# Patient Record
Sex: FEMALE | Race: WHITE | Employment: OTHER | URBAN - METROPOLITAN AREA
[De-identification: names, ages, dates, MRNs, and addresses within clinical notes are randomized per-mention and may not be internally consistent; named-entity substitution may affect disease eponyms.]

---

## 2017-01-01 ENCOUNTER — HOSPITAL ENCOUNTER (OUTPATIENT)
Dept: LAB | Age: 72
Discharge: HOME | End: 2017-05-03
Payer: MEDICARE

## 2017-01-01 ENCOUNTER — TELEPHONE (OUTPATIENT)
Dept: HEMATOLOGY | Age: 72
End: 2017-01-01

## 2017-01-01 ENCOUNTER — OFFICE VISIT (OUTPATIENT)
Dept: HEMATOLOGY | Age: 72
End: 2017-01-01

## 2017-01-01 ENCOUNTER — HOSPITAL ENCOUNTER (OUTPATIENT)
Dept: ULTRASOUND IMAGING | Age: 72
Discharge: HOME | End: 2017-02-24
Attending: INTERNAL MEDICINE
Payer: MEDICARE

## 2017-01-01 ENCOUNTER — HOSPITAL ENCOUNTER (OUTPATIENT)
Dept: LAB | Age: 72
Discharge: HOME | End: 2017-09-11
Payer: MEDICARE

## 2017-01-01 ENCOUNTER — DOCUMENTATION ONLY (OUTPATIENT)
Dept: HEMATOLOGY | Age: 72
End: 2017-01-01

## 2017-01-01 ENCOUNTER — NURSE NAVIGATOR (OUTPATIENT)
Dept: HEMATOLOGY | Age: 72
End: 2017-01-01

## 2017-01-01 VITALS
SYSTOLIC BLOOD PRESSURE: 177 MMHG | WEIGHT: 95.6 LBS | BODY MASS INDEX: 16.94 KG/M2 | HEART RATE: 55 BPM | DIASTOLIC BLOOD PRESSURE: 75 MMHG | TEMPERATURE: 97 F | OXYGEN SATURATION: 99 % | HEIGHT: 63 IN | RESPIRATION RATE: 14 BRPM

## 2017-01-01 VITALS
HEIGHT: 63 IN | WEIGHT: 107 LBS | OXYGEN SATURATION: 100 % | HEART RATE: 54 BPM | BODY MASS INDEX: 18.96 KG/M2 | TEMPERATURE: 97 F | RESPIRATION RATE: 18 BRPM | DIASTOLIC BLOOD PRESSURE: 62 MMHG | SYSTOLIC BLOOD PRESSURE: 172 MMHG

## 2017-01-01 VITALS
HEART RATE: 69 BPM | OXYGEN SATURATION: 98 % | BODY MASS INDEX: 18.25 KG/M2 | HEIGHT: 63 IN | SYSTOLIC BLOOD PRESSURE: 167 MMHG | WEIGHT: 103 LBS | TEMPERATURE: 97 F | DIASTOLIC BLOOD PRESSURE: 72 MMHG | RESPIRATION RATE: 16 BRPM

## 2017-01-01 DIAGNOSIS — Z94.4 S/P LIVER TRANSPLANT (HCC): ICD-10-CM

## 2017-01-01 DIAGNOSIS — R74.8 ELEVATED ALKALINE PHOSPHATASE LEVEL: Primary | ICD-10-CM

## 2017-01-01 DIAGNOSIS — R74.8 ELEVATED ALKALINE PHOSPHATASE LEVEL: ICD-10-CM

## 2017-01-01 DIAGNOSIS — B18.2 CHRONIC HEPATITIS C WITHOUT HEPATIC COMA (HCC): Primary | ICD-10-CM

## 2017-01-01 DIAGNOSIS — Z94.4 S/P LIVER TRANSPLANT (HCC): Primary | ICD-10-CM

## 2017-01-01 DIAGNOSIS — B18.2 CHRONIC HEPATITIS C WITHOUT HEPATIC COMA (HCC): ICD-10-CM

## 2017-01-01 LAB
ALBUMIN SERPL BCP-MCNC: 3.7 G/DL (ref 3.4–5)
ALBUMIN SERPL-MCNC: 3.7 G/DL (ref 3.4–5)
ALBUMIN SERPL-MCNC: 3.8 G/DL (ref 3.6–4.8)
ALBUMIN SERPL-MCNC: 3.9 G/DL (ref 3.6–4.8)
ALBUMIN SERPL-MCNC: 3.9 G/DL (ref 3.6–4.8)
ALBUMIN SERPL-MCNC: 4 G/DL (ref 3.6–4.8)
ALBUMIN SERPL-MCNC: 4 G/DL (ref 3.6–4.8)
ALBUMIN SERPL-MCNC: 4.2 G/DL (ref 3.6–4.8)
ALBUMIN SERPL-MCNC: 4.2 G/DL (ref 3.6–4.8)
ALBUMIN SERPL-MCNC: 4.4 G/DL (ref 3.5–4.8)
ALBUMIN/GLOB SERPL: 1.1 {RATIO} (ref 0.8–1.7)
ALBUMIN/GLOB SERPL: 1.1 {RATIO} (ref 0.8–1.7)
ALP BONE CFR SERPL: 85 % (ref 14–68)
ALP INTEST CFR SERPL: 0 % (ref 0–18)
ALP LIVER CFR SERPL: 15 % (ref 18–85)
ALP SERPL-CCNC: 126 U/L (ref 45–117)
ALP SERPL-CCNC: 134 IU/L (ref 39–117)
ALP SERPL-CCNC: 135 IU/L (ref 39–117)
ALP SERPL-CCNC: 148 U/L (ref 45–117)
ALP SERPL-CCNC: 157 IU/L (ref 39–117)
ALP SERPL-CCNC: 167 IU/L (ref 39–117)
ALP SERPL-CCNC: 189 IU/L (ref 39–117)
ALP SERPL-CCNC: 217 IU/L (ref 39–117)
ALP SERPL-CCNC: 229 IU/L (ref 39–117)
ALP SERPL-CCNC: 242 IU/L (ref 39–117)
ALP SERPL-CCNC: 247 IU/L (ref 39–117)
ALT SERPL-CCNC: 12 IU/L (ref 0–32)
ALT SERPL-CCNC: 13 IU/L (ref 0–32)
ALT SERPL-CCNC: 14 IU/L (ref 0–32)
ALT SERPL-CCNC: 17 IU/L (ref 0–32)
ALT SERPL-CCNC: 19 U/L (ref 13–56)
ALT SERPL-CCNC: 20 IU/L (ref 0–32)
ALT SERPL-CCNC: 23 IU/L (ref 0–32)
ALT SERPL-CCNC: 35 U/L (ref 13–56)
ALT SERPL-CCNC: 8 IU/L (ref 0–32)
ALT SERPL-CCNC: 9 IU/L (ref 0–32)
AMBIG ABBREV BMP8 DEFAULT, 977205: NORMAL
AMBIG ABBREV HFP7 DEFAULT, 977213: NORMAL
ANION GAP BLD CALC-SCNC: 13 MMOL/L (ref 3–18)
ANION GAP SERPL CALC-SCNC: 15 MMOL/L (ref 3–18)
AST SERPL W P-5'-P-CCNC: 16 U/L (ref 15–37)
AST SERPL-CCNC: 15 IU/L (ref 0–40)
AST SERPL-CCNC: 15 IU/L (ref 0–40)
AST SERPL-CCNC: 18 IU/L (ref 0–40)
AST SERPL-CCNC: 19 IU/L (ref 0–40)
AST SERPL-CCNC: 19 IU/L (ref 0–40)
AST SERPL-CCNC: 23 IU/L (ref 0–40)
AST SERPL-CCNC: 26 U/L (ref 15–37)
AST SERPL-CCNC: 31 IU/L (ref 0–40)
AST SERPL-CCNC: 32 IU/L (ref 0–40)
BASOPHILS # BLD AUTO: 0 K/UL (ref 0–0.06)
BASOPHILS # BLD AUTO: 0 X10E3/UL (ref 0–0.2)
BASOPHILS # BLD: 0 % (ref 0–2)
BASOPHILS # BLD: 0 K/UL (ref 0–0.06)
BASOPHILS NFR BLD AUTO: 0 %
BASOPHILS NFR BLD: 0 % (ref 0–2)
BILIRUB DIRECT SERPL-MCNC: 0.1 MG/DL (ref 0–0.2)
BILIRUB DIRECT SERPL-MCNC: 0.13 MG/DL (ref 0–0.4)
BILIRUB DIRECT SERPL-MCNC: 0.17 MG/DL (ref 0–0.4)
BILIRUB DIRECT SERPL-MCNC: 0.18 MG/DL (ref 0–0.4)
BILIRUB DIRECT SERPL-MCNC: 0.19 MG/DL (ref 0–0.4)
BILIRUB DIRECT SERPL-MCNC: 0.2 MG/DL (ref 0–0.2)
BILIRUB DIRECT SERPL-MCNC: 0.21 MG/DL (ref 0–0.4)
BILIRUB DIRECT SERPL-MCNC: 0.21 MG/DL (ref 0–0.4)
BILIRUB DIRECT SERPL-MCNC: 0.23 MG/DL (ref 0–0.4)
BILIRUB DIRECT SERPL-MCNC: 0.26 MG/DL (ref 0–0.4)
BILIRUB SERPL-MCNC: 0.4 MG/DL (ref 0–1.2)
BILIRUB SERPL-MCNC: 0.5 MG/DL (ref 0.2–1)
BILIRUB SERPL-MCNC: 0.5 MG/DL (ref 0–1.2)
BILIRUB SERPL-MCNC: 0.5 MG/DL (ref 0–1.2)
BILIRUB SERPL-MCNC: 0.6 MG/DL (ref 0–1.2)
BILIRUB SERPL-MCNC: 0.7 MG/DL (ref 0.2–1)
BILIRUB SERPL-MCNC: 0.7 MG/DL (ref 0–1.2)
BILIRUB SERPL-MCNC: 0.7 MG/DL (ref 0–1.2)
BUN SERPL-MCNC: 35 MG/DL (ref 8–27)
BUN SERPL-MCNC: 46 MG/DL (ref 8–27)
BUN SERPL-MCNC: 51 MG/DL (ref 8–27)
BUN SERPL-MCNC: 51 MG/DL (ref 8–27)
BUN SERPL-MCNC: 60 MG/DL (ref 8–27)
BUN SERPL-MCNC: 63 MG/DL (ref 8–27)
BUN SERPL-MCNC: 63 MG/DL (ref 8–27)
BUN SERPL-MCNC: 69 MG/DL (ref 8–27)
BUN SERPL-MCNC: 82 MG/DL (ref 7–18)
BUN SERPL-MCNC: 87 MG/DL (ref 7–18)
BUN/CREAT SERPL: 10 (ref 12–20)
BUN/CREAT SERPL: 10 (ref 12–20)
BUN/CREAT SERPL: 6 (ref 12–28)
BUN/CREAT SERPL: 7 (ref 11–26)
BUN/CREAT SERPL: 7 (ref 11–26)
BUN/CREAT SERPL: 8 (ref 11–26)
BUN/CREAT SERPL: 8 (ref 11–26)
BUN/CREAT SERPL: 9 (ref 11–26)
BUN/CREAT SERPL: 9 (ref 12–28)
BUN/CREAT SERPL: 9 (ref 12–28)
CALCIUM SERPL-MCNC: 8.5 MG/DL (ref 8.7–10.3)
CALCIUM SERPL-MCNC: 9 MG/DL (ref 8.7–10.3)
CALCIUM SERPL-MCNC: 9.2 MG/DL (ref 8.5–10.1)
CALCIUM SERPL-MCNC: 9.2 MG/DL (ref 8.7–10.3)
CALCIUM SERPL-MCNC: 9.4 MG/DL (ref 8.7–10.3)
CALCIUM SERPL-MCNC: 9.4 MG/DL (ref 8.7–10.3)
CALCIUM SERPL-MCNC: 9.5 MG/DL (ref 8.7–10.3)
CALCIUM SERPL-MCNC: 9.9 MG/DL (ref 8.5–10.1)
CHLORIDE SERPL-SCNC: 100 MMOL/L (ref 100–108)
CHLORIDE SERPL-SCNC: 90 MMOL/L (ref 96–106)
CHLORIDE SERPL-SCNC: 90 MMOL/L (ref 96–106)
CHLORIDE SERPL-SCNC: 92 MMOL/L (ref 96–106)
CHLORIDE SERPL-SCNC: 92 MMOL/L (ref 96–106)
CHLORIDE SERPL-SCNC: 93 MMOL/L (ref 96–106)
CHLORIDE SERPL-SCNC: 94 MMOL/L (ref 96–106)
CHLORIDE SERPL-SCNC: 95 MMOL/L (ref 96–106)
CHLORIDE SERPL-SCNC: 97 MMOL/L (ref 96–106)
CHLORIDE SERPL-SCNC: 99 MMOL/L (ref 100–108)
CO2 SERPL-SCNC: 18 MMOL/L (ref 18–29)
CO2 SERPL-SCNC: 20 MMOL/L (ref 18–29)
CO2 SERPL-SCNC: 21 MMOL/L (ref 18–29)
CO2 SERPL-SCNC: 21 MMOL/L (ref 21–32)
CO2 SERPL-SCNC: 22 MMOL/L (ref 18–29)
CO2 SERPL-SCNC: 23 MMOL/L (ref 18–29)
CO2 SERPL-SCNC: 26 MMOL/L (ref 18–29)
CO2 SERPL-SCNC: 27 MMOL/L (ref 21–32)
CREAT SERPL-MCNC: 5.69 MG/DL (ref 0.57–1)
CREAT SERPL-MCNC: 6.09 MG/DL (ref 0.57–1)
CREAT SERPL-MCNC: 6.74 MG/DL (ref 0.57–1)
CREAT SERPL-MCNC: 7.14 MG/DL (ref 0.57–1)
CREAT SERPL-MCNC: 7.26 MG/DL (ref 0.57–1)
CREAT SERPL-MCNC: 7.85 MG/DL (ref 0.57–1)
CREAT SERPL-MCNC: 7.86 MG/DL (ref 0.57–1)
CREAT SERPL-MCNC: 7.86 MG/DL (ref 0.6–1.3)
CREAT SERPL-MCNC: 8.38 MG/DL (ref 0.57–1)
CREAT SERPL-MCNC: 8.83 MG/DL (ref 0.6–1.3)
CYCLOSPORINE BLD IA-MCNC: 101 NG/ML (ref 100–400)
CYCLOSPORINE BLD IA-MCNC: 32 NG/ML (ref 100–400)
CYCLOSPORINE BLD IA-MCNC: 52 NG/ML (ref 100–400)
CYCLOSPORINE BLD LC/MS/MS-MCNC: 213 NG/ML (ref 100–400)
CYCLOSPORINE BLD LC/MS/MS-MCNC: 25 NG/ML (ref 100–400)
CYCLOSPORINE BLD LC/MS/MS-MCNC: 321 NG/ML (ref 100–400)
CYCLOSPORINE BLD LC/MS/MS-MCNC: 35 NG/ML (ref 100–400)
CYCLOSPORINE BLD LC/MS/MS-MCNC: 50 NG/ML (ref 100–400)
CYCLOSPORINE BLD LC/MS/MS-MCNC: 62 NG/ML (ref 100–400)
CYCLOSPORINE BLD LC/MS/MS-MCNC: NORMAL NG/ML (ref 100–400)
DIFFERENTIAL METHOD BLD: ABNORMAL
DIFFERENTIAL METHOD BLD: ABNORMAL
EOSINOPHIL # BLD AUTO: 0.2 X10E3/UL (ref 0–0.4)
EOSINOPHIL # BLD AUTO: 0.3 X10E3/UL (ref 0–0.4)
EOSINOPHIL # BLD AUTO: 0.3 X10E3/UL (ref 0–0.4)
EOSINOPHIL # BLD AUTO: 0.4 X10E3/UL (ref 0–0.4)
EOSINOPHIL # BLD AUTO: 0.4 X10E3/UL (ref 0–0.4)
EOSINOPHIL # BLD: 0.2 K/UL (ref 0–0.4)
EOSINOPHIL # BLD: 0.4 K/UL (ref 0–0.4)
EOSINOPHIL NFR BLD AUTO: 2 %
EOSINOPHIL NFR BLD AUTO: 2 %
EOSINOPHIL NFR BLD AUTO: 3 %
EOSINOPHIL NFR BLD AUTO: 3 %
EOSINOPHIL NFR BLD AUTO: 4 %
EOSINOPHIL NFR BLD AUTO: 4 %
EOSINOPHIL NFR BLD AUTO: 5 %
EOSINOPHIL NFR BLD AUTO: 5 %
EOSINOPHIL NFR BLD: 2 % (ref 0–5)
EOSINOPHIL NFR BLD: 4 % (ref 0–5)
ERYTHROCYTE [DISTWIDTH] IN BLOOD BY AUTOMATED COUNT: 16.7 % (ref 12.3–15.4)
ERYTHROCYTE [DISTWIDTH] IN BLOOD BY AUTOMATED COUNT: 16.9 % (ref 11.6–14.5)
ERYTHROCYTE [DISTWIDTH] IN BLOOD BY AUTOMATED COUNT: 17.5 % (ref 12.3–15.4)
ERYTHROCYTE [DISTWIDTH] IN BLOOD BY AUTOMATED COUNT: 17.9 % (ref 11.6–14.5)
ERYTHROCYTE [DISTWIDTH] IN BLOOD BY AUTOMATED COUNT: 18.1 % (ref 12.3–15.4)
ERYTHROCYTE [DISTWIDTH] IN BLOOD BY AUTOMATED COUNT: 18.3 % (ref 12.3–15.4)
ERYTHROCYTE [DISTWIDTH] IN BLOOD BY AUTOMATED COUNT: 18.3 % (ref 12.3–15.4)
ERYTHROCYTE [DISTWIDTH] IN BLOOD BY AUTOMATED COUNT: 18.4 % (ref 12.3–15.4)
ERYTHROCYTE [DISTWIDTH] IN BLOOD BY AUTOMATED COUNT: 19.2 % (ref 12.3–15.4)
ERYTHROCYTE [DISTWIDTH] IN BLOOD BY AUTOMATED COUNT: 20.5 % (ref 12.3–15.4)
GGT SERPL-CCNC: 21 IU/L (ref 0–60)
GLOBULIN SER CALC-MCNC: 3.4 G/DL (ref 2–4)
GLOBULIN SER CALC-MCNC: 3.5 G/DL (ref 2–4)
GLUCOSE SERPL-MCNC: 107 MG/DL (ref 65–99)
GLUCOSE SERPL-MCNC: 125 MG/DL (ref 65–99)
GLUCOSE SERPL-MCNC: 172 MG/DL (ref 74–99)
GLUCOSE SERPL-MCNC: 177 MG/DL (ref 65–99)
GLUCOSE SERPL-MCNC: 195 MG/DL (ref 65–99)
GLUCOSE SERPL-MCNC: 202 MG/DL (ref 65–99)
GLUCOSE SERPL-MCNC: 203 MG/DL (ref 74–99)
GLUCOSE SERPL-MCNC: 204 MG/DL (ref 65–99)
GLUCOSE SERPL-MCNC: 228 MG/DL (ref 65–99)
GLUCOSE SERPL-MCNC: 333 MG/DL (ref 65–99)
HCT VFR BLD AUTO: 32.9 % (ref 34–46.6)
HCT VFR BLD AUTO: 33.8 % (ref 34–46.6)
HCT VFR BLD AUTO: 33.9 % (ref 35–45)
HCT VFR BLD AUTO: 34.2 % (ref 34–46.6)
HCT VFR BLD AUTO: 35.9 % (ref 34–46.6)
HCT VFR BLD AUTO: 37.1 % (ref 34–46.6)
HCT VFR BLD AUTO: 37.8 % (ref 34–46.6)
HCT VFR BLD AUTO: 39.1 % (ref 35–45)
HCT VFR BLD AUTO: 39.4 % (ref 34–46.6)
HCT VFR BLD AUTO: 40.5 % (ref 34–46.6)
HCV GRAPH, HCVGR: NORMAL
HCV RNA SERPL NAA+PROBE-ACNC: NORMAL IU/ML
HGB BLD-MCNC: 11.1 G/DL (ref 11.1–15.9)
HGB BLD-MCNC: 11.1 G/DL (ref 12–16)
HGB BLD-MCNC: 11.2 G/DL (ref 11.1–15.9)
HGB BLD-MCNC: 11.6 G/DL (ref 11.1–15.9)
HGB BLD-MCNC: 11.9 G/DL (ref 11.1–15.9)
HGB BLD-MCNC: 12 G/DL (ref 11.1–15.9)
HGB BLD-MCNC: 12.3 G/DL (ref 11.1–15.9)
HGB BLD-MCNC: 12.8 G/DL (ref 12–16)
HGB BLD-MCNC: 12.9 G/DL (ref 11.1–15.9)
HGB BLD-MCNC: 13 G/DL (ref 11.1–15.9)
IMM GRANULOCYTES # BLD: 0 X10E3/UL (ref 0–0.1)
IMM GRANULOCYTES NFR BLD: 0 %
LYMPHOCYTES # BLD AUTO: 1.5 X10E3/UL (ref 0.7–3.1)
LYMPHOCYTES # BLD AUTO: 1.7 X10E3/UL (ref 0.7–3.1)
LYMPHOCYTES # BLD AUTO: 18 % (ref 21–52)
LYMPHOCYTES # BLD AUTO: 2.7 X10E3/UL (ref 0.7–3.1)
LYMPHOCYTES # BLD AUTO: 2.7 X10E3/UL (ref 0.7–3.1)
LYMPHOCYTES # BLD AUTO: 2.9 X10E3/UL (ref 0.7–3.1)
LYMPHOCYTES # BLD AUTO: 3 X10E3/UL (ref 0.7–3.1)
LYMPHOCYTES # BLD AUTO: 3.2 X10E3/UL (ref 0.7–3.1)
LYMPHOCYTES # BLD AUTO: 3.6 X10E3/UL (ref 0.7–3.1)
LYMPHOCYTES # BLD: 1.6 K/UL (ref 0.9–3.6)
LYMPHOCYTES # BLD: 3.6 K/UL (ref 0.9–3.6)
LYMPHOCYTES NFR BLD AUTO: 21 %
LYMPHOCYTES NFR BLD AUTO: 25 %
LYMPHOCYTES NFR BLD AUTO: 35 %
LYMPHOCYTES NFR BLD AUTO: 36 %
LYMPHOCYTES NFR BLD AUTO: 39 %
LYMPHOCYTES NFR BLD AUTO: 40 %
LYMPHOCYTES NFR BLD AUTO: 41 %
LYMPHOCYTES NFR BLD AUTO: 47 %
LYMPHOCYTES NFR BLD: 42 % (ref 21–52)
Lab: NORMAL
MAGNESIUM SERPL-MCNC: 2.2 MG/DL (ref 1.6–2.3)
MAGNESIUM SERPL-MCNC: 2.3 MG/DL (ref 1.6–2.3)
MAGNESIUM SERPL-MCNC: 2.3 MG/DL (ref 1.6–2.6)
MAGNESIUM SERPL-MCNC: 2.4 MG/DL (ref 1.6–2.3)
MAGNESIUM SERPL-MCNC: 2.4 MG/DL (ref 1.6–2.3)
MAGNESIUM SERPL-MCNC: 2.5 MG/DL (ref 1.6–2.3)
MAGNESIUM SERPL-MCNC: 2.6 MG/DL (ref 1.6–2.3)
MAGNESIUM SERPL-MCNC: 2.7 MG/DL (ref 1.6–2.3)
MAGNESIUM SERPL-MCNC: 2.8 MG/DL (ref 1.6–2.3)
MAGNESIUM SERPL-MCNC: 2.9 MG/DL (ref 1.6–2.6)
MCH RBC QN AUTO: 32.6 PG (ref 26.6–33)
MCH RBC QN AUTO: 33.1 PG (ref 26.6–33)
MCH RBC QN AUTO: 33.2 PG (ref 26.6–33)
MCH RBC QN AUTO: 33.5 PG (ref 24–34)
MCH RBC QN AUTO: 33.5 PG (ref 26.6–33)
MCH RBC QN AUTO: 33.9 PG (ref 26.6–33)
MCH RBC QN AUTO: 34.7 PG (ref 24–34)
MCH RBC QN AUTO: 34.7 PG (ref 26.6–33)
MCH RBC QN AUTO: 34.7 PG (ref 26.6–33)
MCH RBC QN AUTO: 35.1 PG (ref 26.6–33)
MCHC RBC AUTO-ENTMCNC: 32.1 G/DL (ref 31.5–35.7)
MCHC RBC AUTO-ENTMCNC: 32.3 G/DL (ref 31.5–35.7)
MCHC RBC AUTO-ENTMCNC: 32.5 G/DL (ref 31.5–35.7)
MCHC RBC AUTO-ENTMCNC: 32.7 G/DL (ref 31.5–35.7)
MCHC RBC AUTO-ENTMCNC: 32.7 G/DL (ref 31–37)
MCHC RBC AUTO-ENTMCNC: 32.7 G/DL (ref 31–37)
MCHC RBC AUTO-ENTMCNC: 32.8 G/DL (ref 31.5–35.7)
MCHC RBC AUTO-ENTMCNC: 33.1 G/DL (ref 31.5–35.7)
MCHC RBC AUTO-ENTMCNC: 33.9 G/DL (ref 31.5–35.7)
MCHC RBC AUTO-ENTMCNC: 34 G/DL (ref 31.5–35.7)
MCV RBC AUTO: 101 FL (ref 79–97)
MCV RBC AUTO: 102 FL (ref 79–97)
MCV RBC AUTO: 102.4 FL (ref 74–97)
MCV RBC AUTO: 103 FL (ref 79–97)
MCV RBC AUTO: 103 FL (ref 79–97)
MCV RBC AUTO: 105 FL (ref 79–97)
MCV RBC AUTO: 105 FL (ref 79–97)
MCV RBC AUTO: 106 FL (ref 74–97)
MCV RBC AUTO: 106 FL (ref 79–97)
MCV RBC AUTO: 98 FL (ref 79–97)
MONOCYTES # BLD AUTO: 0.3 X10E3/UL (ref 0.1–0.9)
MONOCYTES # BLD AUTO: 0.4 X10E3/UL (ref 0.1–0.9)
MONOCYTES # BLD AUTO: 0.4 X10E3/UL (ref 0.1–0.9)
MONOCYTES # BLD AUTO: 0.5 X10E3/UL (ref 0.1–0.9)
MONOCYTES # BLD AUTO: 0.5 X10E3/UL (ref 0.1–0.9)
MONOCYTES # BLD AUTO: 0.6 X10E3/UL (ref 0.1–0.9)
MONOCYTES # BLD: 0.5 K/UL (ref 0.05–1.2)
MONOCYTES # BLD: 0.6 K/UL (ref 0.05–1.2)
MONOCYTES NFR BLD AUTO: 5 %
MONOCYTES NFR BLD AUTO: 6 %
MONOCYTES NFR BLD AUTO: 7 %
MONOCYTES NFR BLD AUTO: 7 %
MONOCYTES NFR BLD AUTO: 7 % (ref 3–10)
MONOCYTES NFR BLD AUTO: 8 %
MONOCYTES NFR BLD AUTO: 9 %
MONOCYTES NFR BLD: 5 % (ref 3–10)
NEUTROPHILS # BLD AUTO: 2.8 X10E3/UL (ref 1.4–7)
NEUTROPHILS # BLD AUTO: 3.4 X10E3/UL (ref 1.4–7)
NEUTROPHILS # BLD AUTO: 3.8 X10E3/UL (ref 1.4–7)
NEUTROPHILS # BLD AUTO: 4 X10E3/UL (ref 1.4–7)
NEUTROPHILS # BLD AUTO: 4.3 X10E3/UL (ref 1.4–7)
NEUTROPHILS # BLD AUTO: 4.3 X10E3/UL (ref 1.4–7)
NEUTROPHILS # BLD AUTO: 4.5 X10E3/UL (ref 1.4–7)
NEUTROPHILS # BLD AUTO: 4.9 X10E3/UL (ref 1.4–7)
NEUTROPHILS NFR BLD AUTO: 40 %
NEUTROPHILS NFR BLD AUTO: 50 %
NEUTROPHILS NFR BLD AUTO: 51 %
NEUTROPHILS NFR BLD AUTO: 51 %
NEUTROPHILS NFR BLD AUTO: 53 %
NEUTROPHILS NFR BLD AUTO: 54 %
NEUTROPHILS NFR BLD AUTO: 64 %
NEUTROPHILS NFR BLD AUTO: 71 %
NEUTS SEG # BLD: 4.2 K/UL (ref 1.8–8)
NEUTS SEG # BLD: 6.5 K/UL (ref 1.8–8)
NEUTS SEG NFR BLD AUTO: 73 % (ref 40–73)
NEUTS SEG NFR BLD: 49 % (ref 40–73)
PLATELET # BLD AUTO: 208 X10E3/UL (ref 150–379)
PLATELET # BLD AUTO: 216 X10E3/UL (ref 150–379)
PLATELET # BLD AUTO: 220 X10E3/UL (ref 150–379)
PLATELET # BLD AUTO: 235 X10E3/UL (ref 150–379)
PLATELET # BLD AUTO: 237 K/UL (ref 135–420)
PLATELET # BLD AUTO: 247 X10E3/UL (ref 150–379)
PLATELET # BLD AUTO: 260 X10E3/UL (ref 150–379)
PLATELET # BLD AUTO: 270 X10E3/UL (ref 150–379)
PLATELET # BLD AUTO: 276 X10E3/UL (ref 150–379)
PLATELET # BLD AUTO: 277 K/UL (ref 135–420)
PMV BLD AUTO: 10.5 FL (ref 9.2–11.8)
PMV BLD AUTO: 11 FL (ref 9.2–11.8)
POTASSIUM SERPL-SCNC: 4.6 MMOL/L (ref 3.5–5.2)
POTASSIUM SERPL-SCNC: 4.6 MMOL/L (ref 3.5–5.2)
POTASSIUM SERPL-SCNC: 4.9 MMOL/L (ref 3.5–5.2)
POTASSIUM SERPL-SCNC: 5 MMOL/L (ref 3.5–5.2)
POTASSIUM SERPL-SCNC: 5.2 MMOL/L (ref 3.5–5.2)
POTASSIUM SERPL-SCNC: 5.3 MMOL/L (ref 3.5–5.2)
POTASSIUM SERPL-SCNC: 5.3 MMOL/L (ref 3.5–5.5)
POTASSIUM SERPL-SCNC: 5.4 MMOL/L (ref 3.5–5.5)
POTASSIUM SERPL-SCNC: 5.5 MMOL/L (ref 3.5–5.2)
POTASSIUM SERPL-SCNC: 5.9 MMOL/L (ref 3.5–5.2)
PROT SERPL-MCNC: 6.3 G/DL (ref 6–8.5)
PROT SERPL-MCNC: 6.4 G/DL (ref 6–8.5)
PROT SERPL-MCNC: 6.5 G/DL (ref 6–8.5)
PROT SERPL-MCNC: 6.5 G/DL (ref 6–8.5)
PROT SERPL-MCNC: 6.8 G/DL (ref 6–8.5)
PROT SERPL-MCNC: 6.9 G/DL (ref 6–8.5)
PROT SERPL-MCNC: 7.1 G/DL (ref 6.4–8.2)
PROT SERPL-MCNC: 7.1 G/DL (ref 6–8.5)
PROT SERPL-MCNC: 7.1 G/DL (ref 6–8.5)
PROT SERPL-MCNC: 7.2 G/DL (ref 6.4–8.2)
RBC # BLD AUTO: 3.19 X10E6/UL (ref 3.77–5.28)
RBC # BLD AUTO: 3.2 X10E6/UL (ref 3.77–5.28)
RBC # BLD AUTO: 3.31 M/UL (ref 4.2–5.3)
RBC # BLD AUTO: 3.43 X10E6/UL (ref 3.77–5.28)
RBC # BLD AUTO: 3.5 X10E6/UL (ref 3.77–5.28)
RBC # BLD AUTO: 3.54 X10E6/UL (ref 3.77–5.28)
RBC # BLD AUTO: 3.67 X10E6/UL (ref 3.77–5.28)
RBC # BLD AUTO: 3.69 M/UL (ref 4.2–5.3)
RBC # BLD AUTO: 3.89 X10E6/UL (ref 3.77–5.28)
RBC # BLD AUTO: 3.99 X10E6/UL (ref 3.77–5.28)
SERIAL MONITORING: NORMAL
SODIUM SERPL-SCNC: 132 MMOL/L (ref 134–144)
SODIUM SERPL-SCNC: 133 MMOL/L (ref 134–144)
SODIUM SERPL-SCNC: 135 MMOL/L (ref 134–144)
SODIUM SERPL-SCNC: 135 MMOL/L (ref 136–145)
SODIUM SERPL-SCNC: 136 MMOL/L (ref 134–144)
SODIUM SERPL-SCNC: 137 MMOL/L (ref 134–144)
SODIUM SERPL-SCNC: 137 MMOL/L (ref 134–144)
SODIUM SERPL-SCNC: 140 MMOL/L (ref 136–145)
WBC # BLD AUTO: 6.8 X10E3/UL (ref 3.4–10.8)
WBC # BLD AUTO: 6.8 X10E3/UL (ref 3.4–10.8)
WBC # BLD AUTO: 6.9 X10E3/UL (ref 3.4–10.8)
WBC # BLD AUTO: 7 X10E3/UL (ref 3.4–10.8)
WBC # BLD AUTO: 7.4 X10E3/UL (ref 3.4–10.8)
WBC # BLD AUTO: 7.5 X10E3/UL (ref 3.4–10.8)
WBC # BLD AUTO: 8 X10E3/UL (ref 3.4–10.8)
WBC # BLD AUTO: 8.6 K/UL (ref 4.6–13.2)
WBC # BLD AUTO: 8.9 K/UL (ref 4.6–13.2)
WBC # BLD AUTO: 8.9 X10E3/UL (ref 3.4–10.8)

## 2017-01-01 PROCEDURE — 83735 ASSAY OF MAGNESIUM: CPT | Performed by: NURSE PRACTITIONER

## 2017-01-01 PROCEDURE — 0346T US ABD COMP W ELASTOGRAPHY: CPT

## 2017-01-01 PROCEDURE — 87522 HEPATITIS C REVRS TRNSCRPJ: CPT | Performed by: NURSE PRACTITIONER

## 2017-01-01 PROCEDURE — 36415 COLL VENOUS BLD VENIPUNCTURE: CPT | Performed by: NURSE PRACTITIONER

## 2017-01-01 PROCEDURE — 80048 BASIC METABOLIC PNL TOTAL CA: CPT | Performed by: NURSE PRACTITIONER

## 2017-01-01 PROCEDURE — 85025 COMPLETE CBC W/AUTO DIFF WBC: CPT | Performed by: NURSE PRACTITIONER

## 2017-01-01 PROCEDURE — 80158 DRUG ASSAY CYCLOSPORINE: CPT | Performed by: NURSE PRACTITIONER

## 2017-01-01 PROCEDURE — 80076 HEPATIC FUNCTION PANEL: CPT | Performed by: NURSE PRACTITIONER

## 2017-01-01 RX ORDER — CYCLOSPORINE 25 MG/1
CAPSULE, GELATIN COATED ORAL
Qty: 120 CAP | Refills: 11 | Status: SHIPPED | OUTPATIENT
Start: 2017-01-01 | End: 2017-01-01 | Stop reason: SDUPTHER

## 2017-01-01 RX ORDER — CYCLOSPORINE 25 MG/1
CAPSULE, GELATIN COATED ORAL
Qty: 120 CAP | Refills: 11 | Status: SHIPPED | OUTPATIENT
Start: 2017-01-01

## 2017-01-01 RX ORDER — VENLAFAXINE HYDROCHLORIDE 37.5 MG/1
CAPSULE, EXTENDED RELEASE ORAL DAILY
COMMUNITY

## 2017-01-01 RX ORDER — CYCLOSPORINE 25 MG/1
CAPSULE, GELATIN COATED ORAL
Qty: 120 CAP | Refills: 11 | Status: CANCELLED | OUTPATIENT
Start: 2017-01-01

## 2017-01-01 RX ORDER — CYCLOSPORINE 25 MG/1
CAPSULE, GELATIN COATED ORAL
Qty: 120 CAP | Refills: 11
Start: 2017-01-01 | End: 2017-01-01 | Stop reason: SDUPTHER

## 2017-01-25 NOTE — PROGRESS NOTES
NN spoke with patient regarding increasing Cyclosporine dose. Per Dr. Lara Lopez pt advised to increase Cyclosporine 50 mg BID, repeat labs in 1 week. Pt verbalized clear understanding.

## 2017-02-02 NOTE — TELEPHONE ENCOUNTER
Spoke with patient regarding recent lab results 02/1/2017.   Pt Confirmed follow-up appointment with Dr. Pauly Hernández 02/08/17 at 10:00 am.

## 2017-02-08 NOTE — PROGRESS NOTES
[address], MD, FACP, [facility]     April Denita Santos, EFREM Corcoran MD, Helena Costa MD     [address], NP     Amira Arteaga, SKYLAR        [address], [address] [facility], [address]     [phone]     FAX: [address] [place] [address], [address] [place] [address], [address]     [phone]     FAX: [phone]       Patient Care Team:  Natalee Skelton MD as PCP - General (Internal Medicine)  Mei Horner RN as Nurse Navigator (Hepatology)  Amira Arteaga NP as Nurse Practitioner (Nurse Practitioner)  LT: Nasrin Garcia, [place], [place]      Problem List  Date Reviewed: 9/26/2016          Codes Class Noted    S/P liver transplant [facility]) ICD-10-CM: Z94.4  ICD-9-CM: V42.7  4/24/2016    Overview Signed 4/24/2016  7:32 AM by Martine Vizcaino MD     3/2001             Chronic hepatitis C ([facility] [address] ICD-10-CM: F72.6  ICD-9-CM: 070.54  4/24/2016        ESRD (end stage renal disease) on dialysis [facility]) ICD-10-CM: N18.6, Z99.2  ICD-9-CM: 585.6, V45.11  4/24/2016        Type II diabetes mellitus ([facility] [address] ICD-10-CM: E11.9  ICD-9-CM: 250.00  4/24/2016        S/P kidney transplant ICD-10-CM: Z94.0  ICD-9-CM: V42.0  4/24/2016    Overview Signed 4/24/2016  7:33 AM by Martine Vizcaino MD     3/2001.   At same time at LT             Osteoporosis ICD-10-CM: M81.0  ICD-9-CM: 733.00  4/24/2016        Chronic pancreatitis ([facility] [address] ICD-10-CM: K86.1  ICD-9-CM: 295.6  4/24/2016        Celiac disease ICD-10-CM: K90.0  ICD-9-CM: 579.0  4/24/2016        Retinopathy due to secondary diabetes ([facility] ICD-10-CM: E13.319  ICD-9-CM: 249.50, 362.01  4/24/2016        Anemia ICD-10-CM: D64.9  ICD-9-CM: 285.9  4/24/2016        CVA (cerebral vascular accident) [facility]) ICD-10-CM: I63.9  ICD-9-CM: 434.91  4/24/2016    Overview Signed 4/24/2016  7:35 AM by Amy Kohli Natalie Pierce MD     6/2011. Residual right sided weakness             Blind left eye ICD-10-CM: H54.42  ICD-9-CM: 369.60  4/24/2016        Ulcerative colitis ([facility] [address] ICD-10-CM: K51.90  ICD-9-CM: 556.9  4/24/2016        S/P total colectomy ICD-10-CM: Z90.49  ICD-9-CM: V45.72  4/24/2016    Overview Signed 4/24/2016  7:37 AM by Carmela Avila MD     For treatment of refractory UC                   Daniele Sinclair returns to the [address] for management of liver allograft function, to adjust and monitor immune suppression and management of recurrent chronic HCV in the liver graft. The active problem list, all pertinent past medical history, medications, liver histology, endoscopic studies, radiologic findings and laboratory findings related to the liver disorder were reviewed with the patient. The patient is a 73 y.o.  female who underwent a liver transplant in 3/2001 at [address] for cirrhosis secondary to chronic HCV. She began HCV on 08/30/2016 with [facility] [place] without ribavirin. She was treated for 12 weeks total.     She was last seen in 9/2016. After that office appointment and during [facility] treatment she fell and fractured her hip. She underwent hip replacement. She was then in rehab and is now finally home. During this time she did not miss any of the [facility] treatment. The most recent HCV RNA test was in 12/2016. That should have been 1 month after stopping HCV Treatment. She was HCV RNA undetectable. The patient is currently receiving cyclosporine and cellcept for immune suppression. The cyclosporin was adjusted while she was being treated for HCV. After HCV treatment stopped her cyclosporin level bottomed out to undetectable and the dose has since been adjusted. There has been no episodes of acute rejection.   Even when she had undetectable cyclosporin level the liver enzymes were still normal.      The patient underwent a simultaneous renal transplant at the time of the LT. The renal graft failed and has been back on dialysis since 9/2015. She apparently had a seizure on dialysis. This was evaluated by neurology and thought to be secondary to electrolyte shifts and/or hypotension. She is not on anti-seizure medications. The patient notes fatigue especially on dialysis days. She gets around very well with a walker. She lives with her son. The patient has limitations in functional activities secondary to these symptoms. The patient has not experienced fevers, chills, problems concentrating, swelling of the abdomen, swelling of the lower extremities, hematemesis, hematochezia. Allergies   Allergen Reactions    Codeine Other (comments)    Morphine Other (comments)    Naproxen Other (comments)       Current Outpatient Prescriptions   Medication Sig    cycloSPORINE (SANDIMMUNE) 25 mg capsule Take 2 tabs by mouth in the Morning. Take 2 tabs by mouth in the Evening. Indications: PREVENTION OF LIVER TRANSPLANT REJECTION    Dexlansoprazole (DEXILANT) 60 mg CpDB Take 1 Cap by mouth daily.  insulin glargine (LANTUS) 100 unit/mL injection 2 Units by SubCUTAneous route daily.  sodium bicarbonate 325 mg tablet Take 325 mg by mouth four (4) times daily.  predniSONE (DELTASONE) 5 mg tablet Take  by mouth.  b complex vitamins (B COMPLEX 1) tablet Take 1 Tab by mouth daily.  aspirin 81 mg chewable tablet Take 81 mg by mouth daily.  rosuvastatin (CRESTOR) 10 mg tablet Take 10 mg by mouth nightly.  ondansetron (ZOFRAN ODT) 4 mg disintegrating tablet Take 1 Tab by mouth every eight (8) hours as needed for Nausea.  CALCIUM CARBONATE (OS-CHARITY PO) Take 600 mg by mouth.  cholecalciferol, vitamin D3, 2,000 unit tab Take  by mouth.  insulin lispro (HUMALOG) 100 unit/mL injection by SubCUTAneous route.  DARBEPOETIN SHANTI IN POLYSORBAT IJ by Injection route.     amylase-lipase-protease (CREON 18793) 24,000-76,000 -120,000 unit capsule Take  by mouth three (3) times daily (with meals).  mycophenolate (CELLCEPT) 250 mg capsule Take 250 mg by mouth two (2) times a day.  magnesium oxide (MAG-OX) 400 mg tablet Take 400 mg by mouth daily.  nebivolol (BYSTOLIC) 5 mg tablet Take  by mouth daily.  pantoprazole (PROTONIX) 40 mg tablet Take 40 mg by mouth daily.  sodium polystyrene (KAYEXALATE) powder Take 15 g by mouth now. No current facility-administered medications for this visit. SYSTEM REVIEW NOT RELATED TO LIVER DISEASE OR REVIEWED ABOVE:  Constitution systems: Negative for fever, chills, weight gain, weight loss. Eyes: Negative for visual changes. ENT: Negative for sore throat, painful swallowing. Respiratory: Negative for cough, hemoptysis, SOB. Cardiology: Negative for chest pain, palpitations. GI:  Negative for constipation or diarrhea. : Negative for urinary frequency, dysuria, hematuria, nocturia. Skin: Negative for rash. Hematology: Negative for easy bruising, blood clots. Musculo-skelatal: Negative for back pain, muscle pain, weakness. Neurologic: Negative for headaches, dizziness, vertigo, memory problems not related to HE. Psychology: Negative for anxiety, depression. FAMILY HISTORY:  The father  of cancer. The mother  of cancer. There is no family history of liver disease. SOCIAL HISTORY:  The patient is . The patient has 3 children, and 8 grandchildren. The patient has never used tobacco products. The patient has never consumed significant amounts of alcohol. The patient used to work as as a . The patient has not worked since [address] [place] [address].         PHYSICAL EXAMINATION:  Visit Vitals    /75 (BP 1 Location: Left arm, BP Patient Position: Sitting)    Pulse (!) 55    Temp 97 °F (36.1 °C) (Tympanic)    Resp 14    Ht 5' 3\" (1.6 m)    Wt 95 lb 9.6 oz (43.4 kg)    SpO2 99%    BMI 16.93 kg/m2     General:  Uses a walker. Eyes: Sclera anicteric. ENT: No oral lesions. Thyroid normal.  Nodes: No adenopathy. Skin: No spider angiomata. No jaundice. No palmar erythema. Respiratory: Lungs clear to auscultation. Cardiovascular: Regular heart rate. No murmurs. No JVD. Abdomen: Transplant surgery scar. Soft non-tender. Liver size normal to percussion/palpation. Spleen not palpable. No obvious ascites. Extremities: Diaysis shunt. No edema. No muscle wasting. No gross arthritic changes. Neurologic: Alert and oriented. Cranial nerves grossly intact. No asterixis. LABORATORY STUDIES:  [facility] [place] of [address] [place] [address] & Units 1/30/2017 1/23/2017   WBC 3.4 - 10.8 x10E3/uL 6.9 7.0   ANC 1.4 - 7.0 x10E3/uL 4.9 3.4   HGB 11.1 - 15.9 g/dL 12.9 13.0    - 379 x10E3/uL 208 276   AST 0 - 40 IU/L 31 23   ALT 0 - 32 IU/L 13 17   Alk Phos 39 - 117 IU/L 242 (H) 247 (H)   Bili, Total 0.0 - 1.2 mg/dL 0.6 0.6   Bili, Direct 0.00 - 0.40 mg/dL 0.21 0.21   Albumin 3.6 - 4.8 g/dL 3.8 4.2   BUN 8 - 27 mg/dL 60 (H) 51 (H)   Creat 0.57 - 1.00 mg/dL 7.85 (H) 7.26 (H)   Na 134 - 144 mmol/L 132 (L) 136   K 3.5 - 5.2 mmol/L 5.9 (H) 5.5 (H)   Cl 96 - 106 mmol/L 92 (L) 94 (L)   CO2 18 - 29 mmol/L 20 20   Glucose 65 - 99 mg/dL 333 (H) 204 (H)   Magnesium 1.6 - 2.3 mg/dL 2.4 (H) 2.6 (H)     Liver Virology and Transplant Immune Suppression Cyclosporine Level   Latest Ref Rng & Units 100 - 400 ng/mL   1/30/2017 213   1/23/2017 None Detected   10/12/2016 245   9/28/2016 171       SEROLOGIES:  Serologies Latest Ref Rng 3/28/2016   Hep A Ab, Total NEGATIVE   Positive (A)   Hep B Surface Ag <1.00 Index <0.10   Hep B Surface Ag Interp NEG   NEGATIVE   Hep B Core Ab, Total NEGATIVE   NEGATIVE   Hep B Surface Ab >10.0 mIU/mL 45.28   Hep B Surface Ab Interp POS   POSITIVE   Hep C Genotype  1b   HCV RT-PCR, Quant  [id number]     LIVER HISTOLOGY:  7/2006. From [address]. No acute rejection. Recurrent HCV. Mild sinusoidal fibrosis.   5% steatosis. ENDOSCOPIC PROCEDURES:  Not available or performed    RADIOLOGY:  Not available or performed    OTHER TESTIN2015. ECHO heart. Normal LVEF 60%. Mild concentric LVH. Mild dilation LA. Mild tricuspid regurgitation. Mild pulmonary HTN 45 mmHg. Small pericardial effusion. ASSESSMENT AND PLAN:  Liver transplant with normal graft function. Recurrent HCV has been treated with Teofilo Judi. Will repet HCV RNA today. If this is undetectable she has achieved SVR/cure. The most recent laboratory studies indicate that the liver transaminases are normal, alkaline phosphatase is elevated, tests of hepatic synthetic and metabolic function are normal, and the platelet count is normal.     Will perform laboratory testing to monitor liver function and degree of liver injury. This will include hepatic panel, a CBC w/ diff, a BMP. Will measure cyclosporin level. The patient is receiving immune suppression with cyclosporine which is being well tolerated with only mild side effects. The immune suppression blood level is now in the normal range. Will continue the current dose. Persistent elevation in ALP since she fractured her hip. This may be related to bone. Will get GGT and fractionate the ALP. Cellcept is being tolerated well. Will continue at current dose. ESRD will be managed by her nephrologists. She will stay on dialysis. Hypercholesterolemia can be caused by immune suppression. Serum cholesterol is normal and does not need to be treated at this time. Hypertension can be caused by immune suppression and ESRD. Blood pressure is well controlled on current treatment. The patient was counseled regarding alcohol use. The patient was counseled regarding increased risk of skin cancer in transplant recipients and need to have any new skin lesions evaluated by dermatology and removed if suspicious. Osteoporosis is being treated with Vitamin D.   Will need bone density and additional treatment with a diphosphonate if bone density is very low. Patient should receive annual flu-vaccine from their primary care provider. Live vaccines should not be administered. All of the above issues were discussed with the patient. All questions were answered. The patient expressed a clear understanding of the above. [address] [address] in 2 months.       Carin Calderon MD  Liver [address] [place], [place] [address].  [phone]

## 2017-02-08 NOTE — PROGRESS NOTES
Reduction in Cyclosporine 25 mg, 2 tablets in the morning and 1 tablet in the evening (total 75 mg daily) per Dr. Linda Angel. Repeat labs the first of every month. Keep Cycloporine levels between 100-150.   F/u with Brandon Diaz NP.

## 2017-02-08 NOTE — MR AVS SNAPSHOT
Visit Information Date & Time Provider Department Dept. Phone Encounter #  
 2/8/2017 10:00 AM Porsha Kennedy MD [facility] [place] [phone] [id number] Upcoming Health Maintenance Date Due  
 LIPID PANEL Q1 1945 FOOT EXAM Q1 7/26/1957 MICROALBUMIN Q1 7/26/1957 EYE EXAM RETINAL OR DILATED Q1 7/26/1957 DTaP/Tdap/Td series (1 - Tdap) 7/26/1968 BREAST CANCER SCRN MAMMOGRAM 7/26/1997 FOBT Q 1 YEAR AGE 50-75 7/26/1997 ZOSTER VACCINE AGE 60> 7/26/2007 GLAUCOMA SCREENING Q2Y 7/26/2012 Pneumococcal 65+ High/Highest Risk (1 of 2 - PCV13) 7/26/2012 MEDICARE YEARLY EXAM 7/26/2012 INFLUENZA AGE 9 TO ADULT 8/1/2016 HEMOGLOBIN A1C Q6M 3/8/2017 Allergies as of 2/8/2017  Review Complete On: 2/8/2017 By: Jackie Babb Severity Noted Reaction Type Reactions Codeine  03/28/2016    Other (comments) Morphine  03/28/2016    Other (comments) Naproxen  03/28/2016    Other (comments) Current Immunizations  Never Reviewed No immunizations on file. Not reviewed this visit You Were Diagnosed With   
  
 Codes Comments Elevated alkaline phosphatase level    -  Primary ICD-10-CM: R74.8 ICD-9-CM: 790.5 Vitals BP Pulse Temp Resp Height(growth percentile) Weight(growth percentile) 177/75 (BP 1 Location: Left arm, BP Patient Position: Sitting) (!) 55 97 °F (36.1 °C) (Tympanic) 14 5' 3\" (1.6 m) 95 lb 9.6 oz (43.4 kg) SpO2 BMI OB Status Smoking Status 99% 16.93 kg/m2 Postmenopausal Never Smoker Vitals History BMI and BSA Data Body Mass Index Body Surface Area  
 16.93 kg/m 2 1.39 m 2 Preferred Pharmacy Pharmacy Name Phone [facility] [address] [place] [address] [address] [phone] Your Updated Medication List  
  
   
This list is accurate as of: 2/8/17 10:38 AM.  Always use your most recent med list.  
  
  
  
  
 amylase-lipase-protease 24,000-76,000 -120,000 unit capsule Commonly known as:  CREON 32188 Take  by mouth three (3) times daily (with meals). aspirin 81 mg chewable tablet Take 81 mg by mouth daily. B COMPLEX 1 tablet Generic drug:  b complex vitamins Take 1 Tab by mouth daily. cholecalciferol (vitamin D3) 2,000 unit Tab Take  by mouth. cycloSPORINE 25 mg capsule Commonly known as:  SandIMMUNE Take 2 tabs by mouth in the Morning. Take 2 tabs by mouth in the Evening. Indications: PREVENTION OF LIVER TRANSPLANT REJECTION  
  
 DARBEPOETIN SHANTI IN POLYSORBAT INJECTION  
by Injection route. Dexlansoprazole 60 mg Cpdb Commonly known as:  DEXILANT Take 1 Cap by mouth daily. insulin glargine 100 unit/mL injection Commonly known as:  LANTUS  
2 Units by SubCUTAneous route daily. insulin lispro 100 unit/mL injection Commonly known as:  HUMALOG  
by SubCUTAneous route.  
  
 magnesium oxide 400 mg tablet Commonly known as:  MAG-OX Take 400 mg by mouth daily. mycophenolate mofetil 250 mg capsule Commonly known as:  CELLCEPT Take 250 mg by mouth two (2) times a day. nebivolol 5 mg tablet Commonly known as:  BYSTOLIC Take  by mouth daily. ondansetron 4 mg disintegrating tablet Commonly known as:  ZOFRAN ODT Take 1 Tab by mouth every eight (8) hours as needed for Nausea. OS-CHARITY PO Take 600 mg by mouth.  
  
 pantoprazole 40 mg tablet Commonly known as:  PROTONIX Take 40 mg by mouth daily. predniSONE 5 mg tablet Commonly known as:  Marcus Sprawls Take  by mouth. rosuvastatin 10 mg tablet Commonly known as:  CRESTOR Take 10 mg by mouth nightly.  
  
 sodium bicarbonate 325 mg tablet Take 325 mg by mouth four (4) times daily. sodium polystyrene powder Commonly known as:  KAYEXALATE Take 15 g by mouth now. To-Do List   
 02/08/2017 Lab:  ALK PHOS ISOENZYMES   
  
 02/08/2017 Lab:  GGT   
  
 02/08/2017 Imaging:  US ABD COMP W ELASTOGRAPHY Introducing [facility] & HEALTH SERVICES! Dear Coco Huston: Thank you for requesting a [employer] account. Our records indicate that you already have an active [employer] account. You can access your account anytime at [email or web address] Did you know that you can access your hospital and ER discharge instructions at any time in GlobalLogic? You can also review all of your test results from your hospital stay or ER visit. Additional Information If you have questions, please visit the Frequently Asked Questions section of the GlobalLog[employer] website at [email or web address] Remember, GlobalLogic is NOT to be used for urgent needs. For medical emergencies, dial 911. Now available from your iPhone and Android! Please provide this summary of care documentation to your next provider. Your primary care clinician is listed as ANDREW MILLS. If you have any questions after today's visit, please call [phone].

## 2017-03-09 NOTE — PROGRESS NOTES
Dr. Hortensia Chung notified regarding recent labs. Cyclosporine levels 321. Pt currently taking 25 m tables in the morning and 1 tablet in the evening.

## 2017-03-27 NOTE — TELEPHONE ENCOUNTER
NN spoke with patient regarding recent labs. Current cyclosporine level at 25, previous level 321. Advised pt to have labs redrawn, hold on magnesium level 2.8. Pt verbalized clear understanding.

## 2017-03-29 NOTE — TELEPHONE ENCOUNTER
Spoke with patient advise pt to increase cyclosporine 25 mg, 2 capsules in the morning and 2 capsules in the evening. Recent Cycloporine levels to low at 35, pt have labs redrawn in 7-10 days after increase dose. Pt verbalized clear understanding.

## 2017-04-11 NOTE — PROGRESS NOTES
NN contacted LAB [facility]. [phone] regarding cyclosporine levels. Results have been pending for 4 days. Per Slade Congress at [facility] Cyclosporine will be resulted by  04/12/17.

## 2017-04-17 NOTE — TELEPHONE ENCOUNTER
NN spoke with patient regarding recent cyclosporine levels (52). Pt advised per Dr. Azalea Rivera to increase to 2/2 redraw labs in 1 week. Pt verbalized clear understanding.

## 2017-04-26 NOTE — PROGRESS NOTES
Called Dr. Jose David 's office, pt pcp regarding drug interaction with Cyclosporine and Crestor. Drug interaction information from [facility] faxed to [phone]. F/u call to [phone], left voice message regarding above.

## 2017-05-03 NOTE — MR AVS SNAPSHOT
Visit Information Date & Time Provider Department Dept. Phone Encounter #  
 5/3/2017  9:00 AM Kira Benson NP [facility] [place] of [address] [place] [address] [id number] Follow-up Instructions Return in about 3 months (around 8/3/2017). Upcoming Health Maintenance Date Due  
 LIPID PANEL Q1 1945 FOOT EXAM Q1 7/26/1957 MICROALBUMIN Q1 7/26/1957 EYE EXAM RETINAL OR DILATED Q1 7/26/1957 DTaP/Tdap/Td series (1 - Tdap) 7/26/1968 BREAST CANCER SCRN MAMMOGRAM 7/26/1997 FOBT Q 1 YEAR AGE 50-75 7/26/1997 ZOSTER VACCINE AGE 60> 7/26/2007 GLAUCOMA SCREENING Q2Y 7/26/2012 Pneumococcal 65+ High/Highest Risk (1 of 2 - PCV13) 7/26/2012 MEDICARE YEARLY EXAM 7/26/2012 HEMOGLOBIN A1C Q6M 3/8/2017 INFLUENZA AGE 9 TO ADULT 8/1/2017 Allergies as of 5/3/2017  Review Complete On: 5/3/2017 By: Flora Babin Severity Noted Reaction Type Reactions Codeine  03/28/2016    Other (comments) Morphine  03/28/2016    Other (comments) Naproxen  03/28/2016    Other (comments) Current Immunizations  Never Reviewed No immunizations on file. Not reviewed this visit You Were Diagnosed With   
  
 Codes Comments Chronic hepatitis C without hepatic coma (HCC)    -  Primary ICD-10-CM: B18.2 ICD-9-CM: 070.54 Vitals BP Pulse Temp Resp Height(growth percentile) Weight(growth percentile) 167/72 (BP 1 Location: Left arm, BP Patient Position: Sitting) 69 97 °F (36.1 °C) (Tympanic) 16 5' 3\" (1.6 m) 103 lb (46.7 kg) SpO2 BMI OB Status Smoking Status 98% 18.25 kg/m2 Postmenopausal Never Smoker Vitals History BMI and BSA Data Body Mass Index Body Surface Area  
 18.25 kg/m 2 1.44 m 2 Preferred Pharmacy Pharmacy Name Phone [facility] [address]. [phone] Your Updated Medication List  
  
   
 This list is accurate as of: 5/3/17  9:41 AM.  Always use your most recent med list. AMLODIPINE PO Take  by mouth. amylase-lipase-protease 24,000-76,000 -120,000 unit capsule Commonly known as:  CREON 84161 Take  by mouth three (3) times daily (with meals). cycloSPORINE 25 mg capsule Commonly known as:  SandIMMUNE Take 2 tabs by mouth in the Morning. Take 2 tabs by mouth in the Evening. 04/14/17  Indications: PREVENTION OF LIVER TRANSPLANT REJECTION  
  
 insulin glargine 100 unit/mL injection Commonly known as:  LANTUS  
2 Units by SubCUTAneous route daily. insulin lispro 100 unit/mL injection Commonly known as:  HUMALOG  
by SubCUTAneous route. mycophenolate mofetil 250 mg capsule Commonly known as:  CELLCEPT Take 250 mg by mouth two (2) times a day. nebivolol 5 mg tablet Commonly known as:  BYSTOLIC Take  by mouth daily. ondansetron 4 mg disintegrating tablet Commonly known as:  ZOFRAN ODT Take 1 Tab by mouth every eight (8) hours as needed for Nausea. OS-CHARITY PO Take 600 mg by mouth.  
  
 pantoprazole 40 mg tablet Commonly known as:  PROTONIX Take 40 mg by mouth daily. predniSONE 5 mg tablet Commonly known as:  Job Wei Take  by mouth. rosuvastatin 10 mg tablet Commonly known as:  CRESTOR Take 5 mg by mouth nightly.  
  
 sodium bicarbonate 325 mg tablet Take 325 mg by mouth four (4) times daily. sodium polystyrene powder Commonly known as:  KAYEXALATE Take 15 g by mouth now. Follow-up Instructions Return in about 3 months (around 8/3/2017). To-Do List   
 05/03/2017 Lab:  CBC WITH AUTOMATED DIFF   
  
 05/03/2017 Lab:  CYCLOSPORINE   
  
 05/03/2017 Lab:  HCV, QT WITH GRAPH   
  
 05/03/2017 Lab:  HEPATIC FUNCTION PANEL   
  
 05/03/2017 Lab:  MAGNESIUM   
  
 05/03/2017 Lab:  METABOLIC PANEL, BASIC Introducing [facility] & HEALTH SERVICES! Dear Thelma Pascal: Thank you for requesting a [employer] account. Our records indicate that you already have an active [employer] account. You can access your account anytime at [email or web address] Did you know that you can access your hospital and ER discharge instructions at any time in PS[employer]? You can also review all of your test results from your hospital stay or ER visit. Additional Information If you have questions, please visit the Frequently Asked Questions section of the PSafe website at [email or web address] Remember, PSafe is NOT to be used for urgent needs. For medical emergencies, dial 911. Now available from your iPhone and Android! Please provide this summary of care documentation to your next provider. Your primary care clinician is listed as ANDREW MILLS. If you have any questions after today's visit, please call [phone].

## 2017-05-03 NOTE — PROGRESS NOTES
[address], MD, FACP, [facility]     April Sun Lott, EFREM Vergara MD, Mitzi Ovalles MD     [address], NP     Jose Daniel Whitfield NP        [address], [address] Julia Quintero  22.     [phone]     FAX: [address] [place] [address], [address] [place] [address], [address] [phone]     FAX: [phone]       Patient Care Team:  Dorene Merrill MD as PCP - General (Internal Medicine)  Yuko Mar RN as Nurse Navigator (Hepatology)  Jose Daniel Whitfield NP as Nurse Practitioner (Nurse Practitioner)  LT: Kasie Rahman [place], [place]      Problem List  Date Reviewed: 5/3/2017          Codes Class Noted    S/P liver transplant [facility]) ICD-10-CM: Z94.4  ICD-9-CM: V42.7  4/24/2016    Overview Signed 4/24/2016  7:32 AM by Ly Ramirez MD     3/2001             Chronic hepatitis C ([facility] ICD-10-CM: F79.2  ICD-9-CM: 070.54  4/24/2016        ESRD (end stage renal disease) on dialysis [facility]) ICD-10-CM: N18.6, Z99.2  ICD-9-CM: 585.6, V45.11  4/24/2016        Type II diabetes mellitus ([facility] ICD-10-CM: E11.9  ICD-9-CM: 250.00  4/24/2016        S/P kidney transplant ICD-10-CM: Z94.0  ICD-9-CM: V42.0  4/24/2016    Overview Signed 4/24/2016  7:33 AM by Ly Ramirez MD     3/2001.   At same time at LT             Osteoporosis ICD-10-CM: M81.0  ICD-9-CM: 733.00  4/24/2016        Chronic pancreatitis ([facility] ICD-10-CM: K86.1  ICD-9-CM: 099.7  4/24/2016        Celiac disease ICD-10-CM: K90.0  ICD-9-CM: 579.0  4/24/2016        Retinopathy due to secondary diabetes ([facility] ICD-10-CM: E13.319  ICD-9-CM: 249.50, 362.01  4/24/2016        Anemia ICD-10-CM: D64.9  ICD-9-CM: 285.9  4/24/2016        CVA (cerebral vascular accident) [facility]) ICD-10-CM: I63.9  ICD-9-CM: 434.91  4/24/2016    Overview Signed 4/24/2016  7:35 AM by Lowry Rinne Lucero Cohn MD     6/2011. Residual right sided weakness             Blind left eye ICD-10-CM: H54.42  ICD-9-CM: 369.60  4/24/2016        Ulcerative colitis ([facility] ICD-10-CM: K51.90  ICD-9-CM: 556.9  4/24/2016        S/P total colectomy ICD-10-CM: Z90.49  ICD-9-CM: V45.72  4/24/2016    Overview Signed 4/24/2016  7:37 AM by Henry Bassett MD     For treatment of refractory UC                   Elba Alanis returns to the [address] for management of liver allograft function, to adjust and monitor immune suppression and management of recurrent chronic HCV in the liver graft. The active problem list, all pertinent past medical history, medications, liver histology, endoscopic studies, radiologic findings and laboratory findings related to the liver disorder were reviewed with the patient. The patient is a 73 y.o.  female who underwent a liver transplant in 3/2001 at [address] for cirrhosis secondary to chronic HCV. She began HCV on 08/30/2016 with Lore Aquas without ribavirin. She was treated for 12 weeks total.     During HCV treatment she fell and fractured her hip. She underwent hip replacement. She was then in rehab and is now finally home. During this time she did not miss any of the HCV6yt treatment. The most recent HCV RNA test was in 12/2016. That was 1 month after stopping HCV Treatment. She was HCV RNA undetectable then. The patient is currently receiving cyclosporine and cellcept for immune suppression. The cyclosporin was adjusted while she was being treated for HCV. After HCV treatment stopped her cyclosporin level bottomed out to undetectable and the dose has since been adjusted. There has been no episodes of acute rejection. Even when she had undetectable cyclosporin level the liver enzymes were still normal.      The patient underwent a simultaneous renal transplant at the time of the LT.   The renal graft failed and has been back on dialysis since 9/2015. She apparently had a seizure on dialysis. This was evaluated by neurology and thought to be secondary to electrolyte shifts and/or hypotension. She is not on anti-seizure medications. The patient notes fatigue especially on dialysis days. She gets around very well with a walker. She lives with her son. The patient has limitations in functional activities secondary to these symptoms. The patient has not experienced fevers, chills, problems concentrating, swelling of the abdomen, swelling of the lower extremities, hematemesis, hematochezia. Allergies   Allergen Reactions    Codeine Other (comments)    Morphine Other (comments)    Naproxen Other (comments)       Current Outpatient Prescriptions   Medication Sig    AMLODIPINE BESYLATE (AMLODIPINE PO) Take  by mouth.  cycloSPORINE (SANDIMMUNE) 25 mg capsule Take 2 tabs by mouth in the Morning. Take 2 tabs by mouth in the Evening. 04/14/17  Indications: PREVENTION OF LIVER TRANSPLANT REJECTION    insulin glargine (LANTUS) 100 unit/mL injection 2 Units by SubCUTAneous route daily.  sodium bicarbonate 325 mg tablet Take 325 mg by mouth four (4) times daily.  predniSONE (DELTASONE) 5 mg tablet Take  by mouth.  rosuvastatin (CRESTOR) 10 mg tablet Take 5 mg by mouth nightly.  ondansetron (ZOFRAN ODT) 4 mg disintegrating tablet Take 1 Tab by mouth every eight (8) hours as needed for Nausea.  CALCIUM CARBONATE (OS-CHARITY PO) Take 600 mg by mouth.  insulin lispro (HUMALOG) 100 unit/mL injection by SubCUTAneous route.  amylase-lipase-protease (CREON 90880) 24,000-76,000 -120,000 unit capsule Take  by mouth three (3) times daily (with meals).  mycophenolate (CELLCEPT) 250 mg capsule Take 250 mg by mouth two (2) times a day.  nebivolol (BYSTOLIC) 5 mg tablet Take  by mouth daily.  pantoprazole (PROTONIX) 40 mg tablet Take 40 mg by mouth daily.     sodium polystyrene (KAYEXALATE) powder Take 15 g by mouth now. No current facility-administered medications for this visit. SYSTEM REVIEW NOT RELATED TO LIVER DISEASE OR REVIEWED ABOVE:  Constitution systems: Negative for fever, chills, weight gain, weight loss. Eyes: Negative for visual changes. ENT: Negative for sore throat, painful swallowing. Respiratory: Negative for cough, hemoptysis, SOB. Cardiology: Negative for chest pain, palpitations. GI:  Negative for constipation or diarrhea. : Negative for urinary frequency, dysuria, hematuria, nocturia. Skin: Negative for rash. Hematology: Negative for easy bruising, blood clots. Musculo-skelatal: Negative for back pain, muscle pain, weakness. Neurologic: Negative for headaches, dizziness, vertigo, memory problems not related to HE. Psychology: Negative for anxiety, depression. FAMILY HISTORY:  The father  of cancer. The mother  of cancer. There is no family history of liver disease. SOCIAL HISTORY:  The patient is . The patient has 3 children, and 8 grandchildren. The patient has never used tobacco products. The patient has never consumed significant amounts of alcohol. The patient used to work as as a . The patient has not worked since [address] [place] [address]. PHYSICAL EXAMINATION:  Visit Vitals    /72 (BP 1 Location: Left arm, BP Patient Position: Sitting)    Pulse 69    Temp 97 °F (36.1 °C) (Tympanic)    Resp 16    Ht 5' 3\" (1.6 m)    Wt 103 lb (46.7 kg)    SpO2 98%    BMI 18.25 kg/m2     General:  Uses a walker. Eyes: Sclera anicteric. ENT: No oral lesions. Thyroid normal.  Nodes: No adenopathy. Skin: No spider angiomata. No jaundice. No palmar erythema. Respiratory: Lungs clear to auscultation. Cardiovascular: Regular heart rate. No murmurs. No JVD. Abdomen: Transplant surgery scar. Soft non-tender. Liver size normal to percussion/palpation. Spleen not palpable.  No obvious ascites. Extremities: Diaysis shunt. No edema. No muscle wasting. No gross arthritic changes. Neurologic: Alert and oriented. Cranial nerves grossly intact. No asterixis.       LABORATORY STUDIES:  Liver [place] [facility] & Units 5/3/2017 4/21/2017 4/7/2017   WBC 4.6 - 13.2 K/uL 8.9 6.8 8.9   ANC 1.8 - 8.0 K/UL 6.5 2.8 4.5   HGB 12.0 - 16.0 g/dL 12.8 12.0 11.9    - 420 K/uL 237 216 247   AST 15 - 37 U/L 16 18 15   ALT 13 - 56 U/L 19 9 8   Alk Phos 45 - 117 U/L 148 (H) 134 (H) 157 (H)   Bili, Total 0.2 - 1.0 MG/DL 0.7 0.7 0.5   Bili, Direct 0.0 - 0.2 MG/DL 0.2 0.26 0.19   Albumin 3.4 - 5.0 g/dL 3.7 4.0 4.2   BUN 7.0 - 18 MG/DL 82 (H) 35 (H) 51 (H)   Creat 0.6 - 1.3 MG/DL 7.86 (H) 6.09 (H) 5.69 (H)   Na 136 - 145 mmol/L 140 137 137   K 3.5 - 5.5 mmol/L 5.3 4.6 4.6   Cl 100 - 108 mmol/L 100 90 (L) 93 (L)   CO2 21 - 32 mmol/L 27 26 23   Glucose 74 - 99 mg/dL 172 (H) 202 (H) 177 (H)   Magnesium 1.6 - 2.6 mg/dL 2.3 2.2 2.3     Liver Virology and Transplant Immune Suppression Latest Ref Rng & Units 4/21/2017   Cyclosporine 100 - 400 ng/mL    Cyclosporine Level 100 - 400 ng/mL 50 (L)     Liver Virology and Transplant Immune Suppression Latest Ref Rng & Units 3/27/2017   Cyclosporine 100 - 400 ng/mL    Cyclosporine Level 100 - 400 ng/mL 35 (L)     Liver Virology and Transplant Immune Suppression Latest Ref Rng & Units 3/20/2017   Cyclosporine 100 - 400 ng/mL    Cyclosporine Level 100 - 400 ng/mL 25 (L)     Liver Virology and Transplant Immune Suppression Cyclosporine Level   Latest Ref Rng & Units 100 - 400 ng/mL   1/30/2017 213   1/23/2017 None Detected   10/12/2016 245   9/28/2016 171     SEROLOGIES:  Serologies Latest Ref Rng 3/28/2016   Hep A Ab, Total NEGATIVE   Positive (A)   Hep B Surface Ag <1.00 Index <0.10   Hep B Surface Ag Interp NEG   NEGATIVE   Hep B Core Ab, Total NEGATIVE   NEGATIVE   Hep B Surface Ab >10.0 mIU/mL 45.28   Hep B Surface Ab Interp POS   POSITIVE   Hep C Genotype 1b   HCV RT-PCR, Quant  [id number]     LIVER HISTOLOGY:  2006. From [address]. No acute rejection. Recurrent HCV. Mild sinusoidal fibrosis. 5% steatosis. 2017. Shear wave elastography. E Median is 5.08 kPa, no fibrosis. ENDOSCOPIC PROCEDURES:  Not available or performed    RADIOLOGY:  2017. Ultrasound of the liver performed with elastography. The liver is normal in its echo texture and appearance. No focal hepatic lesions are seen. OTHER TESTIN2015. ECHO heart. Normal LVEF 60%. Mild concentric LVH. Mild dilation LA. Mild tricuspid regurgitation. Mild pulmonary HTN 45 mmHg. Small pericardial effusion. ASSESSMENT AND PLAN:  Liver transplant with normal graft function. Recurrent HCV has been treated with Elihu Meals. Will repet HCV RNA today. If this is undetectable she has achieved SVR/cure. The most recent laboratory studies indicate that the liver transaminases are normal, alkaline phosphatase is elevated, tests of hepatic synthetic and metabolic function are normal, and the platelet count is normal.     Will perform laboratory testing to monitor liver function and degree of liver injury. This will include hepatic panel, a CBC w/ diff, a BMP, a cyclosporin level, a magnesium level, and an HCV RNA. The patient is receiving immune suppression with cyclosporine which is being well tolerated with only mild side effects. The immune suppression blood level remains low, but her liver enzymes are normal.  She reports that this happened to her in the past when she the cyclosporin was changed from \"modified CYA to plain CYA\" which is the case here. I will wait to adjust the CYA dose until today's labs are resulted. Persistent elevation in ALP since she fractured her hip. This may be related to bone. Cellcept is being tolerated well. Will continue at current dose. ESRD will be managed by her nephrologists. She will stay on dialysis.     Hypercholesterolemia can be caused by immune suppression. Serum cholesterol is normal and does not need to be treated at this time. Hypertension can be caused by immune suppression and ESRD. Blood pressure is well controlled on current treatment. The patient was counseled regarding alcohol use. The patient was counseled regarding increased risk of skin cancer in transplant recipients and need to have any new skin lesions evaluated by dermatology and removed if suspicious. Osteoporosis is being treated with Vitamin D. Will need bone density and additional treatment with a diphosphonate if bone density is very low. Patient should receive annual flu-vaccine from their primary care provider. Live vaccines should not be administered. All of the above issues were discussed with the patient. All questions were answered. The patient expressed a clear understanding of the above. [facility] 87 in 3 months.       Navin Brannon NP   [facility] [place] [facility] [address], [address] [place] [address]   [phone]

## 2017-06-15 NOTE — TELEPHONE ENCOUNTER
NN reached out to patient, reports she recently had bilateral cataracts surgery 1 week apart. Was unable to have labs done, however, will have them completed next week.

## 2017-08-02 NOTE — TELEPHONE ENCOUNTER
NN spoke with patient discussed recent labs. Cyclosporine levels at 62, per Ellen Mendenhall NP no dose adjustments at this time.

## 2017-09-06 NOTE — PROGRESS NOTES
Pt called to check on future appointment.  Reminded pt she has a scheduled appointment with Leida Dominguez NP September 11, 2017 at 10:00am.

## 2017-09-08 NOTE — TELEPHONE ENCOUNTER
NN spoke with patient, reminded pt of her appointment with NP 09/11/17 at 10. Advised pt not to take cyclosporine the morning of her appointment, labs will be drawn in the office after her visit. Pt verbalized clear understanding. Pt reports she had 2 teeth extracted, developed black and blue on her face the following morning. Pt was asked to hold Plavix x3 days, pt reports she should of been off of the medication longer. Pt stated, \"it looks a lot better. \"

## 2017-09-11 NOTE — PROGRESS NOTES
[address], MD, FACP, [facility]     April Nubia Salinas, EFREM Soto MD, Nelly lAcala MD     [address], NP     Chester Huynh, NP        [place] [address] [facility], [address] [address]     [phone]     FAX: [address] [place] [address], [address] [facility], [address]     [phone]     FAX: [phone]       Patient Care Team:  Luis Manuel Moore MD as PCP - General (Internal Medicine)  Mily Damon RN as Nurse Navigator (Hepatology)  Chester Huynh NP as Nurse Practitioner (Nurse Practitioner)  LT: Silvina Benavidez, [place], [place]      Problem List  Date Reviewed: 9/11/2017          Codes Class Noted    S/P liver transplant [facility]) ICD-10-CM: Z94.4  ICD-9-CM: V42.7  4/24/2016    Overview Signed 4/24/2016  7:32 AM by Governor Arline MD     3/2001             Chronic hepatitis C ([facility] ICD-10-CM: I82.4  ICD-9-CM: 070.54  4/24/2016        ESRD (end stage renal disease) on dialysis [facility]) ICD-10-CM: N18.6, Z99.2  ICD-9-CM: 585.6, V45.11  4/24/2016        Type II diabetes mellitus ([facility] ICD-10-CM: E11.9  ICD-9-CM: 250.00  4/24/2016        S/P kidney transplant ICD-10-CM: Z94.0  ICD-9-CM: V42.0  4/24/2016    Overview Signed 4/24/2016  7:33 AM by Governor rAline MD     3/2001.   At same time at LT             Osteoporosis ICD-10-CM: M81.0  ICD-9-CM: 733.00  4/24/2016        Chronic pancreatitis ([facility] ICD-10-CM: K86.1  ICD-9-CM: 757.4  4/24/2016        Celiac disease ICD-10-CM: K90.0  ICD-9-CM: 579.0  4/24/2016        Retinopathy due to secondary diabetes ([facility] ICD-10-CM: E13.319  ICD-9-CM: 249.50, 362.01  4/24/2016        Anemia ICD-10-CM: D64.9  ICD-9-CM: 285.9  4/24/2016        CVA (cerebral vascular accident) [facility]) ICD-10-CM: I63.9  ICD-9-CM: 434.91  4/24/2016    Overview Signed 4/24/2016  7:35 AM by Easton Joshua Brock Tee MD     6/2011. Residual right sided weakness             Blind left eye ICD-10-CM: H54.42  ICD-9-CM: 369.60  4/24/2016        Ulcerative colitis ([facility] ICD-10-CM: K51.90  ICD-9-CM: 556.9  4/24/2016        S/P total colectomy ICD-10-CM: Z90.49  ICD-9-CM: V45.72  4/24/2016    Overview Signed 4/24/2016  7:37 AM by Cherri Simmons MD     For treatment of refractory UC                   Lissett Rivera returns to the [address] for management of liver allograft function, to adjust and monitor immune suppression and management of recurrent chronic HCV in the liver graft. The active problem list, all pertinent past medical history, medications, liver histology, endoscopic studies, radiologic findings and laboratory findings related to the liver disorder were reviewed with the patient. The patient is a 81 y.o.  female who underwent a liver transplant in 3/2001 at [address] for cirrhosis secondary to chronic HCV. She began HCV on 08/30/2016 with Melodya [place] without ribavirin. She was treated for 12 weeks total.     During HCV treatment she fell and fractured her hip. She underwent hip replacement. She was then in rehab and is now finally home. During this time she did not miss any of the HCV6yt treatment. The most recent HCV RNA test was in 12/2016. That was 1 month after stopping HCV Treatment. She was HCV RNA undetectable then. The patient is currently receiving cyclosporine and cellcept for immune suppression. The cyclosporin was adjusted while she was being treated for HCV. After HCV treatment stopped her cyclosporin level bottomed out to undetectable and the dose has since been adjusted. There has been no episodes of acute rejection. Even when she had undetectable cyclosporin level the liver enzymes were still normal.      The patient underwent a simultaneous renal transplant at the time of the LT.   The renal graft failed and has been back on dialysis since 9/2015. She apparently had a seizure on dialysis. This was evaluated by neurology and thought to be secondary to electrolyte shifts and/or hypotension. She is not on anti-seizure medications. The patient notes fatigue especially on dialysis days. She gets around very well with a walker. She lives with her son. The patient has limitations in functional activities secondary to these symptoms. The patient has not experienced fevers, chills, problems concentrating, swelling of the abdomen, swelling of the lower extremities, hematemesis, hematochezia. Allergies   Allergen Reactions    Codeine Other (comments)    Morphine Other (comments)    Naproxen Other (comments)       Current Outpatient Prescriptions   Medication Sig    venlafaxine-SR (EFFEXOR-XR) 37.5 mg capsule Take  by mouth daily.  AMLODIPINE BESYLATE (AMLODIPINE PO) Take 5 mg by mouth.  cycloSPORINE (SANDIMMUNE) 25 mg capsule Take 2 tabs by mouth in the Morning. Take 2 tabs by mouth in the Evening. 04/14/17  Indications: PREVENTION OF LIVER TRANSPLANT REJECTION    insulin glargine (LANTUS) 100 unit/mL injection 2 Units by SubCUTAneous route daily.  sodium bicarbonate 325 mg tablet Take 325 mg by mouth four (4) times daily.  predniSONE (DELTASONE) 5 mg tablet Take  by mouth.  rosuvastatin (CRESTOR) 10 mg tablet Take 5 mg by mouth nightly.  CALCIUM CARBONATE (OS-CHARITY PO) Take 600 mg by mouth.  insulin lispro (HUMALOG) 100 unit/mL injection by SubCUTAneous route.  amylase-lipase-protease (CREON 25393) 24,000-76,000 -120,000 unit capsule Take  by mouth three (3) times daily (with meals).  mycophenolate (CELLCEPT) 250 mg capsule Take 250 mg by mouth two (2) times a day.  nebivolol (BYSTOLIC) 5 mg tablet Take  by mouth daily.  pantoprazole (PROTONIX) 40 mg tablet Take 40 mg by mouth daily.  sodium polystyrene (KAYEXALATE) powder Take 15 g by mouth now.     ondansetron (ZOFRAN ODT) 4 mg disintegrating tablet Take 1 Tab by mouth every eight (8) hours as needed for Nausea. No current facility-administered medications for this visit. SYSTEM REVIEW NOT RELATED TO LIVER DISEASE OR REVIEWED ABOVE:  Constitution systems: Negative for fever, chills, weight gain, weight loss. Eyes: Negative for visual changes. ENT: Negative for sore throat, painful swallowing. Respiratory: Negative for cough, hemoptysis, SOB. Cardiology: Negative for chest pain, palpitations. GI:  Negative for constipation or diarrhea. : Negative for urinary frequency, dysuria, hematuria, nocturia. Skin: Negative for rash. Hematology: Negative for easy bruising, blood clots. Musculo-skelatal: Negative for back pain, muscle pain, weakness. Neurologic: Negative for headaches, dizziness, vertigo, memory problems not related to HE. Psychology: Negative for anxiety, depression. FAMILY HISTORY:  The father  of cancer. The mother  of cancer. There is no family history of liver disease. SOCIAL HISTORY:  The patient is . The patient has 3 children, and 8 grandchildren. The patient has never used tobacco products. The patient has never consumed significant amounts of alcohol. The patient used to work as as a . The patient has not worked since [address] [place] [address]. PHYSICAL EXAMINATION:  Visit Vitals    /62 (BP 1 Location: Left arm, BP Patient Position: Sitting)    Pulse (!) 54    Temp 97 °F (36.1 °C) (Tympanic)    Resp 18    Ht 5' 3\" (1.6 m)    Wt 107 lb (48.5 kg)    SpO2 100%    BMI 18.95 kg/m2     General:  Uses a walker. Eyes: Sclera anicteric. ENT: No oral lesions. Thyroid normal.  Nodes: No adenopathy. Skin: No spider angiomata. No jaundice. No palmar erythema. Respiratory: Lungs clear to auscultation. Cardiovascular: Regular heart rate. No murmurs. No JVD. Abdomen: Transplant surgery scar. Soft non-tender.   Liver size normal to percussion/palpation. Spleen not palpable. No obvious ascites. Extremities: Diaysis shunt. No edema. No muscle wasting. No gross arthritic changes. Neurologic: Alert and oriented. Cranial nerves grossly intact. No asterixis.       LABORATORY STUDIES:  [facility] of [address] [place] [address] [place] & Units 5/3/2017 4/21/2017 4/7/2017   WBC 4.6 - 13.2 K/uL 8.9 6.8 8.9   ANC 1.8 - 8.0 K/UL 6.5 2.8 4.5   HGB 12.0 - 16.0 g/dL 12.8 12.0 11.9    - 420 K/uL 237 216 247   AST 15 - 37 U/L 16 18 15   ALT 13 - 56 U/L 19 9 8   Alk Phos 45 - 117 U/L 148 (H) 134 (H) 157 (H)   Bili, Total 0.2 - 1.0 MG/DL 0.7 0.7 0.5   Bili, Direct 0.0 - 0.2 MG/DL 0.2 0.26 0.19   Albumin 3.4 - 5.0 g/dL 3.7 4.0 4.2   BUN 7.0 - 18 MG/DL 82 (H) 35 (H) 51 (H)   Creat 0.6 - 1.3 MG/DL 7.86 (H) 6.09 (H) 5.69 (H)   Na 136 - 145 mmol/L 140 137 137   K 3.5 - 5.5 mmol/L 5.3 4.6 4.6   Cl 100 - 108 mmol/L 100 90 (L) 93 (L)   CO2 21 - 32 mmol/L 27 26 23   Glucose 74 - 99 mg/dL 172 (H) 202 (H) 177 (H)   Magnesium 1.6 - 2.6 mg/dL 2.3 2.2 2.3     Liver Virology and Transplant Immune Suppression Latest Ref Rng & Units 4/21/2017   Cyclosporine 100 - 400 ng/mL    Cyclosporine Level 100 - 400 ng/mL 50 (L)     Liver Virology and Transplant Immune Suppression Latest Ref Rng & Units 3/27/2017   Cyclosporine 100 - 400 ng/mL    Cyclosporine Level 100 - 400 ng/mL 35 (L)     Liver Virology and Transplant Immune Suppression Latest Ref Rng & Units 3/20/2017   Cyclosporine 100 - 400 ng/mL    Cyclosporine Level 100 - 400 ng/mL 25 (L)     Liver Virology and Transplant Immune Suppression Cyclosporine Level   Latest Ref Rng & Units 100 - 400 ng/mL   1/30/2017 213   1/23/2017 None Detected   10/12/2016 245   9/28/2016 171     SEROLOGIES:  Serologies Latest Ref Rng 3/28/2016   Hep A Ab, Total NEGATIVE   Positive (A)   Hep B Surface Ag <1.00 Index <0.10   Hep B Surface Ag Interp NEG   NEGATIVE   Hep B Core Ab, Total NEGATIVE   NEGATIVE   Hep B Surface Ab >10.0 mIU/mL 45.28   Hep B Surface Ab Interp POS   POSITIVE   Hep C Genotype  1b   HCV RT-PCR, Quant  [id number]     LIVER HISTOLOGY:  2006. From [address]. No acute rejection. Recurrent HCV. Mild sinusoidal fibrosis. 5% steatosis. 2017. Shear wave elastography. E Median is 5.08 kPa, no fibrosis. ENDOSCOPIC PROCEDURES:  Not available or performed    RADIOLOGY:  2017. Ultrasound of the liver performed with elastography. The liver is normal in its echo texture and appearance. No focal hepatic lesions are seen. OTHER TESTIN2015. ECHO heart. Normal LVEF 60%. Mild concentric LVH. Mild dilation LA. Mild tricuspid regurgitation. Mild pulmonary HTN 45 mmHg. Small pericardial effusion. ASSESSMENT AND PLAN:  Liver transplant with normal graft function. The most recent laboratory studies indicate that the liver transaminases are normal, alkaline phosphatase is elevated, tests of hepatic synthetic and metabolic function are normal, and the platelet count is normal.  Will perform laboratory testing to monitor liver function and degree of liver injury. This will include hepatic panel, a CBC w/ diff, a BMP, a magnesium level, a cyclosporin level, and HCV RNA. Recurrent HCV has been treated with Avelino Coffee. She was SVR 12. The patient is receiving immune suppression with cyclosporine which is being well tolerated with only mild side effects. The immune suppression blood level remains low, but her liver enzymes are normal.  She reports that this happened to her in the past when she the cyclosporin was changed from \"modified CYA to plain CYA\" which is the case here. I will wait to adjust the CYA dose until today's labs are resulted. Persistent elevation in ALP since she fractured her hip. This may be related to bone. Cellcept is being tolerated well. Will continue at current dose. ESRD will be managed by her nephrologists. She will stay on dialysis.     Hypercholesterolemia can be caused by immune suppression. Serum cholesterol is normal and does not need to be treated at this time. Hypertension can be caused by immune suppression and ESRD. Blood pressure is well controlled on current treatment. The patient was counseled regarding alcohol use. The patient was counseled regarding increased risk of skin cancer in transplant recipients and need to have any new skin lesions evaluated by dermatology and removed if suspicious. Osteoporosis is being treated with Vitamin D. Will need bone density and additional treatment with a diphosphonate if bone density is very low. Patient should receive annual flu-vaccine from their primary care provider. Live vaccines should not be administered. All of the above issues were discussed with the patient. All questions were answered. The patient expressed a clear understanding of the above. [facility] 87 in 3 months.       Isatu Pena NP   [facility] of [address] [place] [address], [address] [place] [address]   [phone]

## 2017-09-11 NOTE — MR AVS SNAPSHOT
Visit Information Date & Time Provider Department Dept. Phone Encounter #  
 9/11/2017 10:00 AM [facility] of  [address] [id number] Follow-up Instructions Return in about 6 months (around 3/11/2018). Upcoming Health Maintenance Date Due  
 LIPID PANEL Q1 1945 FOOT EXAM Q1 7/26/1955 MICROALBUMIN Q1 7/26/1957 EYE EXAM RETINAL OR DILATED Q1 7/26/1957 DTaP/Tdap/Td series (1 - Tdap) 7/26/1968 BREAST CANCER SCRN MAMMOGRAM 7/26/1997 FOBT Q 1 YEAR AGE 50-75 7/26/1997 ZOSTER VACCINE AGE 60> 5/26/2007 GLAUCOMA SCREENING Q2Y 7/26/2012 Pneumococcal 65+ High/Highest Risk (1 of 2 - PCV13) 7/26/2012 MEDICARE YEARLY EXAM 7/26/2012 HEMOGLOBIN A1C Q6M 3/8/2017 INFLUENZA AGE 9 TO ADULT 8/1/2017 Allergies as of 9/11/2017  Review Complete On: 9/11/2017 By: Katherin Uribe Severity Noted Reaction Type Reactions Codeine  03/28/2016    Other (comments) Morphine  03/28/2016    Other (comments) Naproxen  03/28/2016    Other (comments) Current Immunizations  Never Reviewed No immunizations on file. Not reviewed this visit You Were Diagnosed With   
  
 Codes Comments Chronic hepatitis C without hepatic coma (HCC)    -  Primary ICD-10-CM: B18.2 ICD-9-CM: 070.54 Vitals BP Pulse Temp Resp Height(growth percentile) Weight(growth percentile) 172/62 (BP 1 Location: Left arm, BP Patient Position: Sitting) (!) 54 97 °F (36.1 °C) (Tympanic) 18 5' 3\" (1.6 m) 107 lb (48.5 kg) SpO2 BMI OB Status Smoking Status 100% 18.95 kg/m2 Postmenopausal Never Smoker BMI and BSA Data Body Mass Index Body Surface Area 18.95 kg/m 2 1.47 m 2 Preferred Pharmacy Pharmacy Name Phone [facility] [address] [id number]. [phone] Your Updated Medication List  
  
   
 This list is accurate as of: 9/11/17 10:31 AM.  Always use your most recent med list. AMLODIPINE PO Take 5 mg by mouth. amylase-lipase-protease 24,000-76,000 -120,000 unit capsule Commonly known as:  CREON 75874 Take  by mouth three (3) times daily (with meals). cycloSPORINE 25 mg capsule Commonly known as:  SandIMMUNE Take 2 tabs by mouth in the Morning. Take 2 tabs by mouth in the Evening. 04/14/17  Indications: PREVENTION OF LIVER TRANSPLANT REJECTION  
  
 insulin glargine 100 unit/mL injection Commonly known as:  LANTUS  
2 Units by SubCUTAneous route daily. insulin lispro 100 unit/mL injection Commonly known as:  HUMALOG  
by SubCUTAneous route. mycophenolate mofetil 250 mg capsule Commonly known as:  CELLCEPT Take 250 mg by mouth two (2) times a day. nebivolol 5 mg tablet Commonly known as:  BYSTOLIC Take  by mouth daily. ondansetron 4 mg disintegrating tablet Commonly known as:  ZOFRAN ODT Take 1 Tab by mouth every eight (8) hours as needed for Nausea. OS-CHARITY PO Take 600 mg by mouth.  
  
 pantoprazole 40 mg tablet Commonly known as:  PROTONIX Take 40 mg by mouth daily. predniSONE 5 mg tablet Commonly known as:  Pansy Hummingbird Take  by mouth. rosuvastatin 10 mg tablet Commonly known as:  CRESTOR Take 5 mg by mouth nightly.  
  
 sodium bicarbonate 325 mg tablet Take 325 mg by mouth four (4) times daily. sodium polystyrene powder Commonly known as:  KAYEXALATE Take 15 g by mouth now. venlafaxine-SR 37.5 mg capsule Commonly known as:  EFFEXOR-XR Take  by mouth daily. Follow-up Instructions Return in about 6 months (around 3/11/2018). To-Do List   
 09/11/2017 Lab:  CBC WITH AUTOMATED DIFF   
  
 09/11/2017 Lab:  CYCLOSPORINE   
  
 09/11/2017 Lab:  HCV, QT WITH GRAPH   
  
 09/11/2017 Lab:  HEPATIC FUNCTION PANEL   
  
 09/11/2017 Lab: MAGNESIUM   
  
 09/11/2017 Lab:  METABOLIC PANEL, BASIC Introducing [facility]! Dear Duaine Merlin: Thank you for requesting a [employer] account. Our records indicate that you already have an active [employer] account. You can access your account anytime at [email or web address] Did you know that you can access your hospital and ER discharge instructions at any time in [employer]? You can also review all of your test results from your hospital stay or ER visit. Additional Information If you have questions, please visit the Frequently Asked Questions section of the [employer] website at [email or web address] Remember, [employer] is NOT to be used for urgent needs. For medical emergencies, dial 911. Now available from your iPhone and Android! Please provide this summary of care documentation to your next provider. Your primary care clinician is listed as Delaney Cohn. If you have any questions after today's visit, please call [phone].

## 2017-10-18 LAB
HCV GRAPH, HCVGR: NORMAL
HCV RNA SERPL NAA+PROBE-ACNC: NORMAL IU/ML
SERIAL MONITORING: NORMAL